# Patient Record
Sex: MALE | Race: BLACK OR AFRICAN AMERICAN | NOT HISPANIC OR LATINO | Employment: FULL TIME | URBAN - METROPOLITAN AREA
[De-identification: names, ages, dates, MRNs, and addresses within clinical notes are randomized per-mention and may not be internally consistent; named-entity substitution may affect disease eponyms.]

---

## 2019-01-17 ENCOUNTER — HOSPITAL ENCOUNTER (OUTPATIENT)
Dept: PHYSICAL THERAPY | Facility: CLINIC | Age: 29
Discharge: HOME | End: 2019-01-17
Attending: EMERGENCY MEDICINE

## 2020-06-05 ENCOUNTER — HOSPITAL ENCOUNTER (OUTPATIENT)
Dept: URGENT CARE | Facility: CLINIC | Age: 30
Discharge: HOME | End: 2020-06-05

## 2020-06-05 LAB
C TRACH RRNA CVX QL NAA+PROBE: NOT DETECTED
CONV HIV-1/ HIV-2: NONREACTIVE
N GONORRHOEA DNA SPEC QL NAA+PROBE: DETECTED
RPR SER QL: NORMAL

## 2020-06-07 LAB — BACTERIA UR CULT: NORMAL

## 2020-06-08 LAB
CONV HERPES TYPE II IGG SUPPLEMENTAL CONFIRMATION: POSITIVE
HSV I/II IGM: <0.91 RATIO (ref 0–0.9)
HSV1 IGG SER IA-ACNC: >62.2 INDEX (ref 0–0.9)
HSV2 IGG SER IA-ACNC: 4.97 INDEX (ref 0–0.9)

## 2020-06-09 LAB
CONV HEPATITIS B SURFACE AG W CONFIRMATION RE: NEGATIVE
HAV IGM SERPL QL IA: NEGATIVE
HBV CORE IGM SERPL QL IA: NEGATIVE
HCV AB SER DONR QL: 0.2 S/CO RATIO (ref 0–0.9)

## 2022-12-21 ENCOUNTER — OFFICE VISIT (OUTPATIENT)
Dept: INTERNAL MEDICINE | Facility: CLINIC | Age: 32
End: 2022-12-21

## 2022-12-21 VITALS
SYSTOLIC BLOOD PRESSURE: 132 MMHG | HEART RATE: 66 BPM | BODY MASS INDEX: 25.19 KG/M2 | WEIGHT: 186 LBS | DIASTOLIC BLOOD PRESSURE: 80 MMHG | OXYGEN SATURATION: 99 % | TEMPERATURE: 97.5 F | HEIGHT: 72 IN

## 2022-12-21 DIAGNOSIS — Z00.00 ANNUAL PHYSICAL EXAM: Primary | ICD-10-CM

## 2022-12-21 PROCEDURE — 99385 PREV VISIT NEW AGE 18-39: CPT | Performed by: INTERNAL MEDICINE

## 2022-12-21 NOTE — PROGRESS NOTES
"Chief Complaint  Establish Care (Physical and referral /)    Subjective       Luiz Jennings presents to [facility]    HPI   Works at [employer] as     Girlfriend Michelle blunt, has  Dejon Jennings  Have 5 kids combined.    Presenting to establish care    Very active, trying to eat right  Had episode of muscle cramping while working out a while back. Has not occurred since.       Objective     Vitals:    22 1055   BP: 132/80   Pulse: 66   Temp: 97.5 °F (36.4 °C)   SpO2: 99%   Weight: 84.4 kg (186 lb)   Height: 182.9 cm (72\")      Wt Readings from Last 3 Encounters:   22 84.4 kg (186 lb)      BP Readings from Last 3 Encounters:   22 132/80        Body mass index is 25.23 kg/m².           Physical Exam  Vitals reviewed.   Constitutional:       Appearance: Normal appearance. He is well-developed.   HENT:      Head: Normocephalic and atraumatic.      Mouth/Throat:      Pharynx: No oropharyngeal exudate.   Eyes:      Conjunctiva/sclera: Conjunctivae normal.      Pupils: Pupils are equal, round, and reactive to light.   Cardiovascular:      Rate and Rhythm: Normal rate and regular rhythm.      Heart sounds: No murmur heard.    No friction rub. No gallop.   Pulmonary:      Effort: Pulmonary effort is normal.      Breath sounds: Normal breath sounds. No wheezing or rhonchi.   Abdominal:      General: Abdomen is flat. Bowel sounds are normal. There is no distension.      Palpations: Abdomen is soft.      Tenderness: There is no abdominal tenderness.   Skin:     General: Skin is warm and dry.   Neurological:      Mental Status: He is alert and oriented to person, place, and time.   Psychiatric:         Mood and Affect: Affect normal.          Result Review :   The following data was reviewed by: Katy Farah MD on 2022:        Procedures    Assessment and Plan   Diagnoses and all orders for this " visit:    1. Annual physical exam (Primary)  Assessment & Plan:  Screening labs reviewed/ordered  Counseling provided regarding age appropriate screenings and immunizations, healthy diet and exercise.     Orders:  -     Comprehensive Metabolic Panel  -     CBC & Differential  -     TSH  -     Lipid Panel        Follow Up   Return in about 1 year (around 12/21/2023) for Annual physical.  Patient was given instructions and counseling regarding his condition or for health maintenance advice. Please see specific information pulled into the AVS if appropriate.

## 2023-02-03 DIAGNOSIS — Z30.09 VASECTOMY EVALUATION: Primary | ICD-10-CM
